# Patient Record
Sex: FEMALE | Race: WHITE | NOT HISPANIC OR LATINO | URBAN - METROPOLITAN AREA
[De-identification: names, ages, dates, MRNs, and addresses within clinical notes are randomized per-mention and may not be internally consistent; named-entity substitution may affect disease eponyms.]

---

## 2019-12-10 ENCOUNTER — OUTPATIENT (OUTPATIENT)
Dept: OUTPATIENT SERVICES | Facility: HOSPITAL | Age: 59
LOS: 1 days | End: 2019-12-10
Payer: COMMERCIAL

## 2019-12-10 VITALS
OXYGEN SATURATION: 98 % | HEIGHT: 66 IN | HEART RATE: 73 BPM | RESPIRATION RATE: 14 BRPM | TEMPERATURE: 98 F | SYSTOLIC BLOOD PRESSURE: 148 MMHG | WEIGHT: 212.08 LBS | DIASTOLIC BLOOD PRESSURE: 92 MMHG

## 2019-12-10 DIAGNOSIS — Z98.890 OTHER SPECIFIED POSTPROCEDURAL STATES: Chronic | ICD-10-CM

## 2019-12-10 DIAGNOSIS — Z01.818 ENCOUNTER FOR OTHER PREPROCEDURAL EXAMINATION: ICD-10-CM

## 2019-12-10 DIAGNOSIS — M72.2 PLANTAR FASCIAL FIBROMATOSIS: ICD-10-CM

## 2019-12-10 DIAGNOSIS — Z98.891 HISTORY OF UTERINE SCAR FROM PREVIOUS SURGERY: Chronic | ICD-10-CM

## 2019-12-10 DIAGNOSIS — M77.32 CALCANEAL SPUR, LEFT FOOT: ICD-10-CM

## 2019-12-10 LAB
ANION GAP SERPL CALC-SCNC: 9 MMOL/L — SIGNIFICANT CHANGE UP (ref 5–17)
BUN SERPL-MCNC: 16 MG/DL — SIGNIFICANT CHANGE UP (ref 7–23)
CALCIUM SERPL-MCNC: 9 MG/DL — SIGNIFICANT CHANGE UP (ref 8.4–10.5)
CHLORIDE SERPL-SCNC: 106 MMOL/L — SIGNIFICANT CHANGE UP (ref 96–108)
CO2 SERPL-SCNC: 25 MMOL/L — SIGNIFICANT CHANGE UP (ref 22–31)
CREAT SERPL-MCNC: 0.64 MG/DL — SIGNIFICANT CHANGE UP (ref 0.5–1.3)
GLUCOSE SERPL-MCNC: 104 MG/DL — HIGH (ref 70–99)
HCT VFR BLD CALC: 42.1 % — SIGNIFICANT CHANGE UP (ref 34.5–45)
HGB BLD-MCNC: 13.6 G/DL — SIGNIFICANT CHANGE UP (ref 11.5–15.5)
MCHC RBC-ENTMCNC: 30.4 PG — SIGNIFICANT CHANGE UP (ref 27–34)
MCHC RBC-ENTMCNC: 32.3 GM/DL — SIGNIFICANT CHANGE UP (ref 32–36)
MCV RBC AUTO: 94 FL — SIGNIFICANT CHANGE UP (ref 80–100)
NRBC # BLD: 0 /100 WBCS — SIGNIFICANT CHANGE UP (ref 0–0)
PLATELET # BLD AUTO: 217 K/UL — SIGNIFICANT CHANGE UP (ref 150–400)
POTASSIUM SERPL-MCNC: 4.4 MMOL/L — SIGNIFICANT CHANGE UP (ref 3.5–5.3)
POTASSIUM SERPL-SCNC: 4.4 MMOL/L — SIGNIFICANT CHANGE UP (ref 3.5–5.3)
RBC # BLD: 4.48 M/UL — SIGNIFICANT CHANGE UP (ref 3.8–5.2)
RBC # FLD: 13.1 % — SIGNIFICANT CHANGE UP (ref 10.3–14.5)
SODIUM SERPL-SCNC: 140 MMOL/L — SIGNIFICANT CHANGE UP (ref 135–145)
WBC # BLD: 6.28 K/UL — SIGNIFICANT CHANGE UP (ref 3.8–10.5)
WBC # FLD AUTO: 6.28 K/UL — SIGNIFICANT CHANGE UP (ref 3.8–10.5)

## 2019-12-10 PROCEDURE — 80048 BASIC METABOLIC PNL TOTAL CA: CPT

## 2019-12-10 PROCEDURE — 93010 ELECTROCARDIOGRAM REPORT: CPT | Mod: NC

## 2019-12-10 PROCEDURE — G0463: CPT

## 2019-12-10 PROCEDURE — 36415 COLL VENOUS BLD VENIPUNCTURE: CPT

## 2019-12-10 PROCEDURE — 85027 COMPLETE CBC AUTOMATED: CPT

## 2019-12-10 PROCEDURE — 93005 ELECTROCARDIOGRAM TRACING: CPT

## 2019-12-10 NOTE — H&P PST ADULT - ATTENDING COMMENTS
I spoke with this patient and she has no medical com-plaints today or changes in medical status since PST.  Michelle López PA-C

## 2019-12-10 NOTE — H&P PST ADULT - HISTORY OF PRESENT ILLNESS
60 yo female presents with 11 month history of left foot pain. She received cortisone injections when the pain started and again in September with no relief. She was also treated with "shock wave therapy" with temporary. Reports 9/10 at rest and increased to 10/10 with activity. She is currently using a scooter and attempting not to weight bear. She had orthotics made which she is unable to use. Pain mildly relieved with aleve.

## 2019-12-10 NOTE — H&P PST ADULT - NSICDXPASTMEDICALHX_GEN_ALL_CORE_FT
PAST MEDICAL HISTORY:  Anxiety     Heel spur, left     Left foot pain     Osteoarthritis     Seasonal allergies PAST MEDICAL HISTORY:  Anxiety     Heel spur, left     Left foot pain     Obesity (BMI 30.0-34.9)     Osteoarthritis     Seasonal allergies

## 2019-12-10 NOTE — H&P PST ADULT - MUSCULOSKELETAL
details… detailed exam no joint swelling/no joint erythema/no joint warmth/no calf tenderness/diminished strength/ROM intact/left foot

## 2019-12-10 NOTE — H&P PST ADULT - NSICDXPASTSURGICALHX_GEN_ALL_CORE_FT
PAST SURGICAL HISTORY:  H/O  section     H/O foot surgery left for plantar fasciitis     History of repair of ACL left knee     History of tonsillectomy and adenoidectomy as child    S/P FESS (functional endoscopic sinus surgery) 1989

## 2019-12-10 NOTE — H&P PST ADULT - NSICDXPROBLEM_GEN_ALL_CORE_FT
PROBLEM DIAGNOSES  Problem: Heel spur, left  Assessment and Plan: excision of heel spur left foot and partial plantar fasciectomy left foot. medical clearance requested. pepcid and surgical wash instructions reviewed and verbalized understanding. stop aleve today. may take xanax AM of surgery if needed.

## 2019-12-10 NOTE — H&P PST ADULT - RS GEN PE MLT RESP DETAILS PC
airway patent/breath sounds equal/no wheezes/no rhonchi/good air movement/clear to auscultation bilaterally/respirations non-labored/no rales

## 2019-12-10 NOTE — H&P PST ADULT - NSICDXFAMILYHX_GEN_ALL_CORE_FT
FAMILY HISTORY:  Father  Still living? Yes, Estimated age: 81-90  Family history of hyperlipidemia, Age at diagnosis: Age Unknown  Family history of osteoarthritis, Age at diagnosis: Age Unknown  Family history of valvular heart disease, Age at diagnosis: Age Unknown    Mother  Still living? Yes, Estimated age: 71-80  Family history of hyperlipidemia, Age at diagnosis: Age Unknown  Family history of type 2 diabetes mellitus, Age at diagnosis: Age Unknown  FHx: coronary artery disease, Age at diagnosis: Age Unknown

## 2019-12-10 NOTE — H&P PST ADULT - NSANTHOSAYNRD_GEN_A_CORE
neck 15.5 inches/No. SEBASTIAN screening performed.  STOP BANG Legend: 0-2 = LOW Risk; 3-4 = INTERMEDIATE Risk; 5-8 = HIGH Risk

## 2019-12-12 ENCOUNTER — TRANSCRIPTION ENCOUNTER (OUTPATIENT)
Age: 59
End: 2019-12-12

## 2019-12-13 ENCOUNTER — OUTPATIENT (OUTPATIENT)
Dept: OUTPATIENT SERVICES | Facility: HOSPITAL | Age: 59
LOS: 1 days | End: 2019-12-13
Payer: COMMERCIAL

## 2019-12-13 VITALS
SYSTOLIC BLOOD PRESSURE: 111 MMHG | TEMPERATURE: 99 F | HEART RATE: 78 BPM | HEIGHT: 66 IN | WEIGHT: 212.08 LBS | DIASTOLIC BLOOD PRESSURE: 74 MMHG | OXYGEN SATURATION: 97 % | RESPIRATION RATE: 14 BRPM

## 2019-12-13 VITALS
DIASTOLIC BLOOD PRESSURE: 68 MMHG | RESPIRATION RATE: 16 BRPM | SYSTOLIC BLOOD PRESSURE: 106 MMHG | OXYGEN SATURATION: 97 % | HEART RATE: 70 BPM

## 2019-12-13 DIAGNOSIS — Z98.891 HISTORY OF UTERINE SCAR FROM PREVIOUS SURGERY: Chronic | ICD-10-CM

## 2019-12-13 DIAGNOSIS — M77.32 CALCANEAL SPUR, LEFT FOOT: ICD-10-CM

## 2019-12-13 DIAGNOSIS — Z98.890 OTHER SPECIFIED POSTPROCEDURAL STATES: Chronic | ICD-10-CM

## 2019-12-13 DIAGNOSIS — M72.2 PLANTAR FASCIAL FIBROMATOSIS: ICD-10-CM

## 2019-12-13 PROCEDURE — 76000 FLUOROSCOPY <1 HR PHYS/QHP: CPT

## 2019-12-13 PROCEDURE — 88304 TISSUE EXAM BY PATHOLOGIST: CPT

## 2019-12-13 PROCEDURE — 73620 X-RAY EXAM OF FOOT: CPT

## 2019-12-13 PROCEDURE — 73620 X-RAY EXAM OF FOOT: CPT | Mod: 26,LT

## 2019-12-13 PROCEDURE — 28119 REMOVAL OF HEEL SPUR: CPT | Mod: LT

## 2019-12-13 PROCEDURE — 88304 TISSUE EXAM BY PATHOLOGIST: CPT | Mod: 26

## 2019-12-13 PROCEDURE — 28060 PARTIAL REMOVAL FOOT FASCIA: CPT | Mod: LT

## 2019-12-13 RX ORDER — SODIUM CHLORIDE 9 MG/ML
1000 INJECTION, SOLUTION INTRAVENOUS
Refills: 0 | Status: DISCONTINUED | OUTPATIENT
Start: 2019-12-13 | End: 2019-12-13

## 2019-12-13 RX ORDER — HYDROMORPHONE HYDROCHLORIDE 2 MG/ML
0.5 INJECTION INTRAMUSCULAR; INTRAVENOUS; SUBCUTANEOUS ONCE
Refills: 0 | Status: DISCONTINUED | OUTPATIENT
Start: 2019-12-13 | End: 2019-12-13

## 2019-12-13 RX ORDER — ONDANSETRON 8 MG/1
4 TABLET, FILM COATED ORAL ONCE
Refills: 0 | Status: DISCONTINUED | OUTPATIENT
Start: 2019-12-13 | End: 2019-12-13

## 2019-12-13 RX ORDER — ALPRAZOLAM 0.25 MG
1 TABLET ORAL
Qty: 0 | Refills: 0 | DISCHARGE

## 2019-12-13 RX ADMIN — SODIUM CHLORIDE 50 MILLILITER(S): 9 INJECTION, SOLUTION INTRAVENOUS at 12:38

## 2019-12-13 NOTE — BRIEF OPERATIVE NOTE - NSICDXBRIEFPREOP_GEN_ALL_CORE_FT
PRE-OP DIAGNOSIS:  Plantar fasciitis, left 13-Dec-2019 15:12:29  Triston Nielson  Heel spur, left 13-Dec-2019 15:12:16  Triston Nielson

## 2019-12-13 NOTE — ASU DISCHARGE PLAN (ADULT/PEDIATRIC) - CARE PROVIDER_API CALL
Triston Nielson (DPM)  Podiatric Medicine and Surgery  [address] [place] [address]  Phone: [phone]  Fax: [phone]  Follow Up Time:

## 2019-12-13 NOTE — ASU PATIENT PROFILE, ADULT - PMH
Anxiety    Heel spur, left    Left foot pain    Obesity (BMI 30.0-34.9)    Osteoarthritis    Seasonal allergies

## 2019-12-13 NOTE — BRIEF OPERATIVE NOTE - NSICDXBRIEFPROCEDURE_GEN_ALL_CORE_FT
PROCEDURES:  Partial plantar fasciectomy 13-Dec-2019 15:11:28  Triston Nielson  Excision of heel spur 13-Dec-2019 15:10:36  Triston Nielson

## 2019-12-13 NOTE — ASU PATIENT PROFILE, ADULT - PSH
H/O  section    H/O foot surgery  left for plantar fasciitis   History of repair of ACL  left knee   History of tonsillectomy and adenoidectomy  as child  S/P FESS (functional endoscopic sinus surgery)  1989

## 2019-12-13 NOTE — ASU DISCHARGE PLAN (ADULT/PEDIATRIC) - CALL YOUR DOCTOR IF YOU HAVE ANY OF THE FOLLOWING:
Numbness, tingling, color or temperature change to extremity/Swelling that gets worse/Fever greater than (need to indicate Fahrenheit or Celsius)/Pain not relieved by Medications/Nausea and vomiting that does not stop/Excessive diarrhea/Inability to tolerate liquids or foods/Increased irritability or sluggishness/Unable to urinate/Wound/Surgical Site with redness, or foul smelling discharge or pus/Bleeding that does not stop

## 2019-12-13 NOTE — BRIEF OPERATIVE NOTE - NSICDXBRIEFPOSTOP_GEN_ALL_CORE_FT
POST-OP DIAGNOSIS:  Plantar fasciitis, left 13-Dec-2019 15:13:24  Triston Nielson  Heel spur, left 13-Dec-2019 15:12:38  Triston Nielson

## 2019-12-20 LAB — SURGICAL PATHOLOGY STUDY: SIGNIFICANT CHANGE UP

## 2021-07-29 PROBLEM — E66.9 OBESITY, UNSPECIFIED: Chronic | Status: ACTIVE | Noted: 2019-12-10

## 2021-07-29 PROBLEM — J30.2 OTHER SEASONAL ALLERGIC RHINITIS: Chronic | Status: ACTIVE | Noted: 2019-12-10

## 2021-07-29 PROBLEM — M19.90 UNSPECIFIED OSTEOARTHRITIS, UNSPECIFIED SITE: Chronic | Status: ACTIVE | Noted: 2019-12-10

## 2021-07-29 PROBLEM — M77.32 CALCANEAL SPUR, LEFT FOOT: Chronic | Status: ACTIVE | Noted: 2019-12-10

## 2021-07-29 PROBLEM — F41.9 ANXIETY DISORDER, UNSPECIFIED: Chronic | Status: ACTIVE | Noted: 2019-12-10

## 2021-07-29 PROBLEM — M79.672 PAIN IN LEFT FOOT: Chronic | Status: ACTIVE | Noted: 2019-12-10

## 2021-08-05 ENCOUNTER — OUTPATIENT (OUTPATIENT)
Dept: OUTPATIENT SERVICES | Facility: HOSPITAL | Age: 61
LOS: 1 days | End: 2021-08-05
Payer: COMMERCIAL

## 2021-08-05 DIAGNOSIS — M54.16 RADICULOPATHY, LUMBAR REGION: ICD-10-CM

## 2021-08-05 DIAGNOSIS — Z98.890 OTHER SPECIFIED POSTPROCEDURAL STATES: Chronic | ICD-10-CM

## 2021-08-05 DIAGNOSIS — Z98.891 HISTORY OF UTERINE SCAR FROM PREVIOUS SURGERY: Chronic | ICD-10-CM

## 2021-08-05 PROCEDURE — 62323 NJX INTERLAMINAR LMBR/SAC: CPT

## 2023-11-28 PROBLEM — Z00.00 ENCOUNTER FOR PREVENTIVE HEALTH EXAMINATION: Status: ACTIVE | Noted: 2023-11-28

## 2023-12-06 ENCOUNTER — APPOINTMENT (OUTPATIENT)
Dept: ORTHOPEDIC SURGERY | Facility: CLINIC | Age: 63
End: 2023-12-06
Payer: COMMERCIAL

## 2023-12-06 VITALS
SYSTOLIC BLOOD PRESSURE: 109 MMHG | DIASTOLIC BLOOD PRESSURE: 73 MMHG | WEIGHT: 195 LBS | HEIGHT: 67 IN | HEART RATE: 83 BPM | BODY MASS INDEX: 30.61 KG/M2

## 2023-12-06 DIAGNOSIS — Z86.59 PERSONAL HISTORY OF OTHER MENTAL AND BEHAVIORAL DISORDERS: ICD-10-CM

## 2023-12-06 DIAGNOSIS — Z78.9 OTHER SPECIFIED HEALTH STATUS: ICD-10-CM

## 2023-12-06 PROCEDURE — 99203 OFFICE O/P NEW LOW 30 MIN: CPT

## 2023-12-06 RX ORDER — ALPRAZOLAM 0.25 MG/1
0.25 TABLET ORAL
Refills: 0 | Status: ACTIVE | COMMUNITY

## 2023-12-06 RX ORDER — MELOXICAM 15 MG/1
15 TABLET ORAL
Qty: 21 | Refills: 0 | Status: ACTIVE | COMMUNITY
Start: 2023-12-06 | End: 1900-01-01

## 2023-12-29 ENCOUNTER — APPOINTMENT (OUTPATIENT)
Dept: PHYSICAL MEDICINE AND REHAB | Facility: CLINIC | Age: 63
End: 2023-12-29
Payer: COMMERCIAL

## 2023-12-29 VITALS
HEART RATE: 75 BPM | SYSTOLIC BLOOD PRESSURE: 130 MMHG | HEIGHT: 66 IN | DIASTOLIC BLOOD PRESSURE: 86 MMHG | BODY MASS INDEX: 31.18 KG/M2 | WEIGHT: 194 LBS

## 2023-12-29 DIAGNOSIS — M47.816 SPONDYLOSIS W/OUT MYELOPATHY OR RADICULOPATHY, LUMBAR REGION: ICD-10-CM

## 2023-12-29 DIAGNOSIS — M25.551 PAIN IN RIGHT HIP: ICD-10-CM

## 2023-12-29 PROCEDURE — 99204 OFFICE O/P NEW MOD 45 MIN: CPT

## 2023-12-29 NOTE — HISTORY OF PRESENT ILLNESS
[FreeTextEntry1] : Ms. GRAYSON KOEHLER is a 63 year old female who presents with low back/R hip pain.   Location:R>L low back/ R hip Onset: Started around 10/2023, no inciting event Provocation/Palliative:Worse with activity and bending, better with rest Quality:Achy Radiation: None Severity: Mild now Timing: Overall somewhat improving with time  Denies any associated numbness. Denies any associated leg weakness. Denies any loss of bowel/bladder control or any groin numbness. Previous medications trialed: Mobic with some help but tries not to take anything Previous procedures relevant to complaint: None Conservative therapy tried?:Chiropractic care with mild relief

## 2023-12-29 NOTE — PHYSICAL EXAM
[FreeTextEntry1] : PE: Constitutional: In NAD, calm and cooperative MSK (Back/R hip) 	Inspection: no gross swelling identified 	Palpation: Mild tenderness of the bilateral lower lumbar paraspinals 	ROM: Minimal pain at end lumbar extension, no pain with flexion 	Strength: 5/5 strength in bilateral lower extremities 	Reflexes: 2+ Patella reflex bilaterally, 2+ Achilles reflex bilaterally, negative clonus bilaterally 	Sensation: Intact to light touch in bilateral lower extremities Special tests: SLR: negative bilaterally DELLA:negative bilaterally FADIR: negative bilaterally Facet loading: positive on right, negative on left

## 2023-12-29 NOTE — DATA REVIEWED
[FreeTextEntry1] : X-rays done by chiropractor 10/2/23 reviewwed and interpreted by me: multilevel degenerative changes seen, decreased L5-S1 disc space   11:53:57 GMT  -- Final Report  Patient Name : ANDER HAYNES^GRAYSON^^^^ Patient ID : [id number] Patient  : 1960 Accession Number : [id number]   PATIENT NAME: GRAYSON LOPEZ  ID NUMBER: [id number]  YOB: 1960  REFERRING PHYSICIAN: HOLLIS SMILEY DC [facility] [address]  DATE OF SERVICE: 11/10/2023  MAGNETIC RESONANCE IMAGING OF RIGHT HIP  TECHNIQUE: Multiplanar, multisequential MRI was performed in the 30 degree tilt position.  HISTORY: The patient complains of right hip pain.  INTERPRETATION: There is lateral acetabular spur formation and there is erosion and superficial tear involving the superior labrum located laterally and extending anterolaterally.  There is edema tracking in the peritrochanteric soft tissues related to gluteus medius tendinosis/tendinopathy with fraying and shallow partial thickness peripheral tearing. There is greater trochanteric bursitis and diffuse peribursal inflammation which is tracking deep to the gluteus medius in the fascial plane  the gluteus medius and minimus tendons superior to the greater trochanter.  There is mild marginal spur formation at the lateral femoral neck region.  There is lower lumbar spine diminished disc height and hydration loss with Schmorl's node invaginations. There is a small benign appearing subcentimeter cystic focus at the right superior end plate of S1 adjacent to the L5-S1 disc, partly visualized on this hip MRI.  The alignment of the hip joint is otherwise satisfactory. The bone marrow signal of the femoral head and acetabulum as well as the visualized surrounding structures of the hemipelvis are unremarkable without infiltration or edema. There are no other findings to indicate ischemic necrosis. The tendinous and muscular structures surrounding the hip joint are otherwise unremarkable and are without any focal masses or signal alteration. There is no other significant joint effusion.  IMPRESSION:  * Lateral acetabular spur formation and there is erosion and superficial tear involving the superior labrum located laterally and extending anterolaterally.  * Edema tracking in the peritrochanteric soft tissues related to gluteus medius tendinosis/tendinopathy with fraying and shallow partial thickness peripheral tearing. There is greater trochanteric bursitis and diffuse peribursal inflammation which is tracking deep to the gluteus medius in the fascial plane  the gluteus medius and minimus tendons superior to the greater trochanter.  * Mild marginal spur formation at the lateral femoral neck region.  * Lumbar spine findings as discussed above and clinical assessment is advised.  Paulino Watson M.D.  Diplomate of the [employer] Board of Radiology    SW/BC

## 2023-12-29 NOTE — ASSESSMENT
[FreeTextEntry1] : Ms. GRAYSON KOEHLER is a 63 year old female who presents with R hip and low back pain, likely from her underlying spondylosis, unlikely due to intrinsic hip pathology. Denies any red flag signs. Will recommend: - X-ray L Spine reviewed. MRI R hip reviewed - Start PT 2-3x/week for stretching, strengthening (especially of core muscles), ROM exercises, HEP and modalities PRN including myofascial release, moist heat  - She will continue occasional Mobic, advised on R/B/A but also recommended Tylenol 1000mg BID PRN if needed.  - If pain continues, can consider MRI L Spine at next visit  RTC in 4-6 weeks. Patient aware of red flag signs including any changes to their bowel/bladder control, groin numbness or new weakness. Patient knows to seek immediate attention by calling 911 or going to nearest ER if these symptoms appear.

## 2024-02-07 ENCOUNTER — APPOINTMENT (OUTPATIENT)
Dept: PHYSICAL MEDICINE AND REHAB | Facility: CLINIC | Age: 64
End: 2024-02-07